# Patient Record
Sex: MALE | Race: WHITE | URBAN - METROPOLITAN AREA
[De-identification: names, ages, dates, MRNs, and addresses within clinical notes are randomized per-mention and may not be internally consistent; named-entity substitution may affect disease eponyms.]

---

## 2022-02-01 ENCOUNTER — OFFICE VISIT (OUTPATIENT)
Dept: URBAN - METROPOLITAN AREA CLINIC 43 | Facility: CLINIC | Age: 67
End: 2022-02-01
Payer: MEDICARE

## 2022-02-01 DIAGNOSIS — H04.123 DRY EYE SYNDROME OF BILATERAL LACRIMAL GLANDS: ICD-10-CM

## 2022-02-01 DIAGNOSIS — E11.9 TYPE 2 DIABETES MELLITUS W/O COMPLICATION: Primary | ICD-10-CM

## 2022-02-01 DIAGNOSIS — Z79.84 LONG TERM (CURRENT) USE OF ORAL HYPOGLYCEMIC DRUGS: ICD-10-CM

## 2022-02-01 DIAGNOSIS — H25.13 AGE-RELATED NUCLEAR CATARACT, BILATERAL: ICD-10-CM

## 2022-02-01 DIAGNOSIS — H05.242 CONSTANT EXOPHTHALMOS, LEFT EYE: ICD-10-CM

## 2022-02-01 PROCEDURE — 99204 OFFICE O/P NEW MOD 45 MIN: CPT | Performed by: OPTOMETRIST

## 2022-02-01 ASSESSMENT — KERATOMETRY
OD: 43.25
OS: 42.38

## 2022-02-01 ASSESSMENT — INTRAOCULAR PRESSURE
OS: 17
OD: 18

## 2022-02-01 ASSESSMENT — VISUAL ACUITY
OD: 20/30
OS: 20/25

## 2022-02-01 NOTE — IMPRESSION/PLAN
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  Plan: Pt notices left eye bulges, pt denies thyroid disease, asymmetric exophthalmometry OS>OD, refer to oculoplastics for consult

## 2022-02-01 NOTE — IMPRESSION/PLAN
Impression: Type 2 diabetes mellitus w/o complication: F16.0. Plan: Diabetes type II: no background retinopathy, no signs of neovascularization noted. Discussed ocular and systemic benefits of blood sugar control.

## 2022-03-17 ENCOUNTER — OFFICE VISIT (OUTPATIENT)
Dept: URBAN - METROPOLITAN AREA CLINIC 44 | Facility: CLINIC | Age: 67
End: 2022-03-17
Payer: MEDICARE

## 2022-03-17 PROCEDURE — 99204 OFFICE O/P NEW MOD 45 MIN: CPT | Performed by: OPHTHALMOLOGY

## 2022-03-17 NOTE — IMPRESSION/PLAN
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 Plan: left proptosis present for approximately 1 year per patient. Denies thyroid disease. Denies loss of vision OS, denies double vision. + smoker On exam, 3mm proptosis OS. EOM's full. No apd. Mild resistance to retropulsion. VA 20/20 OS Will get CT max face with and without contrast. Check TSH, TSI, TPO, t3, t4, as patient has hx of cigarette smoking x 50 years. RTC 1 month or sooner PRN.

## 2022-05-02 NOTE — IMPRESSION/PLAN
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 Plan: CT scan with signs of mild SACHA; likely inactive but will monitor. no signs of optic nerve compression or significant muscle expansion OU. no masses OU. Instructed patient to follow up with PCP regarding elevated TSH and low t3; will send report to PCP. Symptoms of itchy/dry eye unchanged. cont AFT's and tear gel chaparro. Start Zaditor or Alaway BID OU. RTC 2-3 months or sooner PRN.

## 2022-08-02 NOTE — IMPRESSION/PLAN
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 Plan: Previous CT scan with signs of mild SACHA; stable. No changes on exam. Re-instructed patient to follow up with PCP regarding elevated TSH and low t3; will send report to PCP. Symptoms of itchy/dry eye with some improvement. recommend starting AFT's 4-5x per day. okay to f/u with Dr. Nba Díaz for other eyecare needs. May be candidate for cataract surgery OD.

## 2024-05-15 NOTE — IMPRESSION/PLAN
CDXU=588/97 mmhg, Resp=27 B/min, EtCO2=34 mmHg, Apnea=0 Seconds, Pain=2, Analilia=10, Kc=2 Impression: Long term (current) use of oral hypoglycemic drugs: Z79.84. Plan: See above plan.